# Patient Record
Sex: FEMALE | Race: OTHER | HISPANIC OR LATINO | URBAN - METROPOLITAN AREA
[De-identification: names, ages, dates, MRNs, and addresses within clinical notes are randomized per-mention and may not be internally consistent; named-entity substitution may affect disease eponyms.]

---

## 2023-03-31 ENCOUNTER — EMERGENCY (EMERGENCY)
Facility: HOSPITAL | Age: 63
LOS: 1 days | Discharge: HOME | End: 2023-03-31
Attending: STUDENT IN AN ORGANIZED HEALTH CARE EDUCATION/TRAINING PROGRAM
Payer: MEDICAID

## 2023-03-31 VITALS
SYSTOLIC BLOOD PRESSURE: 132 MMHG | OXYGEN SATURATION: 100 % | HEIGHT: 61 IN | RESPIRATION RATE: 18 BRPM | HEART RATE: 72 BPM | WEIGHT: 117.07 LBS | DIASTOLIC BLOOD PRESSURE: 82 MMHG | TEMPERATURE: 98 F

## 2023-03-31 PROCEDURE — 99283 EMERGENCY DEPT VISIT LOW MDM: CPT

## 2023-03-31 PROCEDURE — 99284 EMERGENCY DEPT VISIT MOD MDM: CPT

## 2023-03-31 RX ORDER — METRONIDAZOLE 500 MG
1 TABLET ORAL
Qty: 21 | Refills: 0
Start: 2023-03-31 | End: 2023-04-06

## 2023-03-31 RX ORDER — IBUPROFEN 200 MG
600 TABLET ORAL ONCE
Refills: 0 | Status: COMPLETED | OUTPATIENT
Start: 2023-03-31 | End: 2023-03-31

## 2023-03-31 RX ORDER — CEFPODOXIME PROXETIL 100 MG
2 TABLET ORAL
Qty: 28 | Refills: 0
Start: 2023-03-31 | End: 2023-04-06

## 2023-03-31 RX ORDER — DIPHENHYDRAMINE HCL 50 MG
50 CAPSULE ORAL ONCE
Refills: 0 | Status: COMPLETED | OUTPATIENT
Start: 2023-03-31 | End: 2023-03-31

## 2023-03-31 RX ADMIN — Medication 600 MILLIGRAM(S): at 13:00

## 2023-03-31 RX ADMIN — Medication 50 MILLIGRAM(S): at 13:00

## 2023-03-31 RX ADMIN — Medication 600 MILLIGRAM(S): at 13:30

## 2023-03-31 NOTE — ED PROVIDER NOTE - NS ED ATTENDING STATEMENT MOD
This was a shared visit with the KADE. I reviewed and verified the documentation and independently performed the documented:

## 2023-03-31 NOTE — ED PROVIDER NOTE - ENMT, MLM
Airway patent, Nasal mucosa clear. Mouth with normal mucosa. Throat has no vesicles, no oropharyngeal exudates and uvula is midline. No trismus, tongue protrusion

## 2023-03-31 NOTE — ED PROVIDER NOTE - ATTENDING APP SHARED VISIT CONTRIBUTION OF CARE
#[id number]: 63-year-old female with no past medical history presents with a red rash to her neck that began yesterday. Patient denies history of any similar symptoms.  Patient denies any recent travel, anyone around her with similar symptoms, any ill contacts.  Patient denies any new exposures such as detergent, new clothes, new jewelry, or new medications.  Patient states she takes 2 medications at home medications that are unchanged in dose or type of medication.  Patient states she has seasonal allergies and is allergic to pollen otherwise no known allergies or no known drug allergies.  No food allergies that are known to the patient.  Patient states she has some throat pain however she denies any trouble swallowing, any change in her voice, any drooling.  Patient denies chest pain or shortness of breath.  Patient denies any eating any new food.  Patient denies fever, chills, cough, congestion, rhinorrhea, leg edema.  Patient states the rash is only on her neck, denies any other rash.On exam, vital signs are within normal limits, patient in no acute distress, patient has patch of erythema over her anterior neck approximately 7 x 5 cm with mild warmth and mild tenderness to palpation, borders are not well demarcated, with no crepitus, fluctuation, induration.  GCS 15.  Airway is patent  Uvula is midline and nonedematous, no tonsillar swelling or exudate.  No lesions in oropharynx.  No tongue or lip swelling.  No submandibular swelling.  No tongue elevation.  No trismus, stridor, wheezing, rhonchi, or crackles.  No increased work of breathing or accessory muscle use.  No drooling.  No dysphonia.  Regular rate and rhythm, normal S1 and S2, no S3 or S4, no murmurs rubs or gallops.  Lungs clear to auscultation bilaterally, no increased work of breathing or accessory muscle use.  Abdomen soft nondistended nontender.  No rash appreciated on body other than on anterior neck.  No neck swelling.  Neck is supple.  No drooling.  DDx: Cellulitis versus allergic reaction.  As rash is painful and not itchy, signs and symptoms are more concerning for cellulitis rather than allergic reaction.  Although dermatitis is possible.  As borders are not well demarcated, erysipelas less likely and cellulitis more likely.  While Robby's angina is a possible complication of anterior neck cellulitis, patient has no submandibular swelling, no crepitus, no neck swelling, no drooling, no dysphonia.  As cellulitis is external, no evidence of RPA or PTA, patient has no dysphonia or drooling and uvula is midline.  Plan is to draw a line around the area of erythema with skin marker, have patient return in 48 hours for wound check, and give antibiotics (cefdinir and Flagyl), that will treat both cellulitis and odontogenic infection and have patient return in 48 hours.  Patient given strict return precautions for any worsening symptoms to come to the emergency department earlier.

## 2023-03-31 NOTE — ED PROVIDER NOTE - PATIENT PORTAL LINK FT
You can access the FollowMyHealth Patient Portal offered by [facility] by registering at the following website: [email or web address]. By joining [employer]’s FollowMyHealth portal, you will also be able to view your health information using other applications (apps) compatible with our system.

## 2023-03-31 NOTE — ED PROVIDER NOTE - NSFOLLOWUPINSTRUCTIONS_ED_ALL_ED_FT
Regrese en 48 horas para que le evalúen el arlin.    Antibióticos enviados a la farmacia para gama síntomas. Tómelo según las indicaciones y hasta completar todo el medicamento.    Si el enrojecimiento se extiende fuera del área marcada, regrese a la baljeet de emergencias de inmediato.    Puede guy motrin/tylenol según sea necesario para el dolor.    Si experimenta síntomas nuevos o que empeoran, o si está preocupado, siempre puede regresar a la emergencia para delaney reevaluación.    Return in 48 hours to have your neck evaluated.     Antibiotics sent to the pharmacy for your symptoms. Take as directed and until all of the medication is completed.    If the redness extends outside of the marked area, return to the emergency room immediately.     You can take motrin/tylenol as needed for pain.    If you experience any new or worsening symptoms or if you are concerned you can always come back to the emergency for a re-evaluation.

## 2023-03-31 NOTE — ED PROVIDER NOTE - CLINICAL SUMMARY MEDICAL DECISION MAKING FREE TEXT BOX
63 year old female with erythematous macular papular rash that began yesterday. No pruritis. Will give motrin and benadryl. 63 year old female with erythematous macular papular rash that began yesterday. No pruritis. No Dysphagia, odynophagia, trismus, edema of upper midline neck or floor of mouth, no raised tongue, no brawny texture to floor of mouth with visible swelling and erythema. No stridor, drooling,, tongue protrusion. Will give motrin and benadryl. Will treat for possible cellulitis or very early Ludwigs with strict return precautions and have patient return to the ED within 48 hours for an evaluation.

## 2023-03-31 NOTE — ED PROVIDER NOTE - OBJECTIVE STATEMENT
#[id number]: 63-year-old female with no past medical history presents with a red rash to her neck that began yesterday.  Patient reports it is not itchy.  Reports of burning sensation and pain to the area when she moves her neck left or right or up and down.  She denies shortness of breath, vomiting, swelling to her mouth, new soaps, new medicines, new foods

## 2023-04-02 ENCOUNTER — EMERGENCY (EMERGENCY)
Facility: HOSPITAL | Age: 63
LOS: 1 days | Discharge: HOME | End: 2023-04-02
Attending: EMERGENCY MEDICINE
Payer: MEDICAID

## 2023-04-02 VITALS
WEIGHT: 117.07 LBS | HEIGHT: 61 IN | SYSTOLIC BLOOD PRESSURE: 125 MMHG | DIASTOLIC BLOOD PRESSURE: 83 MMHG | TEMPERATURE: 98 F | RESPIRATION RATE: 17 BRPM | HEART RATE: 77 BPM | OXYGEN SATURATION: 100 %

## 2023-04-02 PROBLEM — Z78.9 OTHER SPECIFIED HEALTH STATUS: Chronic | Status: ACTIVE | Noted: 2023-03-31

## 2023-04-02 PROCEDURE — 99283 EMERGENCY DEPT VISIT LOW MDM: CPT

## 2023-04-02 PROCEDURE — 99282 EMERGENCY DEPT VISIT SF MDM: CPT

## 2023-04-02 NOTE — ED PROVIDER NOTE - OBJECTIVE STATEMENT
63-year-old female with no past medical history presents after being evaluated on March 31 for cellulitis to her neck.  Patient presents for a skin check.  Patient reports that the redness is getting better, remains not itchy, no longer has pain.  Denies any fevers.

## 2023-04-02 NOTE — ED PROVIDER NOTE - PHYSICAL EXAMINATION
Neck - improving area of erythema. Significantly smaller than when evaluated on 3/31. No pain. No trismus, drooling, or inability to swallow.

## 2023-04-02 NOTE — ED PROVIDER NOTE - NSFOLLOWUPINSTRUCTIONS_ED_ALL_ED_FT
Timi un seguimiento con gleason médico de atención primaria dentro de 1 semana.    Continúe tomando los antibióticos que le recetaron hasta que se terminen, incluso si se siente mejor.    Puede guy motrin/tylenol según sea necesario para el dolor.    Si experimenta síntomas nuevos o que empeoran, o si está preocupado, siempre puede regresar a la emergencia para delaney reevaluación.    Follow up with your primary care doctor within 1 week.     Continue to take the antibiotics that were prescribed until they are all gone, even if you are feeling better.     You can take motrin/tylenol as needed for pain.    If you experience any new or worsening symptoms or if you are concerned you can always come back to the emergency for a re-evaluation.

## 2023-04-02 NOTE — ED PROVIDER NOTE - PATIENT PORTAL LINK FT
You can access the FollowMyHealth Patient Portal offered by [facility] by registering at the following website: [email or web address]. By joining [employer]’s FollowMyHealth portal, you will also be able to view your health information using other applications (apps) compatible with our system.

## 2023-04-02 NOTE — ED PROVIDER NOTE - ATTENDING APP SHARED VISIT CONTRIBUTION OF CARE
63 year old female with improving cellulitis to her neck. Will have patient continue with antibiotics and follow up with PCP.

## 2024-10-04 NOTE — ED ADULT TRIAGE NOTE - BP NONINVASIVE SYSTOLIC (MM HG)
125 Last Office Visit: 8/7/24  Last Refill: 8/14/24- clobetasol   8/7/24- tamsulosin, glipizide- both too soon to refill.   7/22/24- simvastatin   6/26/24- dicyclomine   Return to Clinic: 6 months?   Passed- dicyclomine, simvastatin, and tamsulosin   N/a- clobetasol   NOV: 11/8/24    Requested Prescriptions     Pending Prescriptions Disp Refills    dicyclomine 10 MG Oral Cap 60 capsule 2     Sig: Take 1 capsule (10 mg total) by mouth 2 (two) times daily before meals.    simvastatin 40 MG Oral Tab 90 tablet 0     Sig: Take 1 tablet (40 mg total) by mouth nightly.    tamsulosin 0.4 MG Oral Cap 90 capsule 0     Sig: Take 1 capsule (0.4 mg total) by mouth daily.    glipiZIDE ER 5 MG Oral Tablet 24 Hr 90 tablet 0     Sig: Take 1 tablet (5 mg total) by mouth daily.    Clobetasol Propionate 0.05 % External Shampoo 118 mL 0     Sig: Apply to scalp two times daily       Please approve if appropriate, clobetasol shampoo.     Thank you!